# Patient Record
Sex: MALE | Race: WHITE | Employment: OTHER | URBAN - METROPOLITAN AREA
[De-identification: names, ages, dates, MRNs, and addresses within clinical notes are randomized per-mention and may not be internally consistent; named-entity substitution may affect disease eponyms.]

---

## 2017-01-04 ENCOUNTER — HOSPITAL ENCOUNTER (EMERGENCY)
Facility: HOSPITAL | Age: 67
Discharge: HOME | End: 2017-01-04
Attending: EMERGENCY MEDICINE
Payer: MEDICARE

## 2017-01-04 VITALS
WEIGHT: 158.38 LBS | BODY MASS INDEX: 25 KG/M2 | RESPIRATION RATE: 18 BRPM | DIASTOLIC BLOOD PRESSURE: 77 MMHG | HEART RATE: 74 BPM | OXYGEN SATURATION: 100 % | TEMPERATURE: 98 F | SYSTOLIC BLOOD PRESSURE: 123 MMHG

## 2017-01-04 DIAGNOSIS — R55 SYNCOPE, VASOVAGAL: Primary | ICD-10-CM

## 2017-01-04 LAB
ATRIAL RATE: 61 BPM
P AXIS: 35 DEGREES
P-R INTERVAL: 180 MS
Q-T INTERVAL: 398 MS
QRS DURATION: 86 MS
QTC CALCULATION (BEZET): 400 MS
R AXIS: -27 DEGREES
T AXIS: 23 DEGREES
VENTRICULAR RATE: 61 BPM

## 2017-01-04 PROCEDURE — 99284 EMERGENCY DEPT VISIT MOD MDM: CPT

## 2017-01-04 PROCEDURE — 93005 ELECTROCARDIOGRAM TRACING: CPT

## 2017-01-04 PROCEDURE — 93010 ELECTROCARDIOGRAM REPORT: CPT

## 2017-01-04 NOTE — ED PROVIDER NOTES
Patient Seen in: [facility] Emergency Department    History   Patient presents with:  Syncope (cardiovascular, neurologic)    Stated Complaint: syncope    HPI    21-year-old male presents emergency department who was sent to the ER after he had a [employer] Systems    Positive for stated complaint: syncope  Other systems are as noted in HPI. Constitutional and vital signs reviewed. All other systems reviewed and negative except as noted above.     [facility] elements reviewed from today and agreed except as ot medical condition and instructed to take medications as prescribed. Patient is aware that they are to return to ED if any worsening problems. Patient was also instructed to followup with the appropriate physician.   Patient verbalizes and agrees with plan

## 2018-08-06 PROBLEM — E66.3 OVERWEIGHT (BMI 25.0-29.9): Status: ACTIVE | Noted: 2018-08-06

## 2018-08-06 PROCEDURE — 84153 ASSAY OF PSA TOTAL: CPT | Performed by: INTERNAL MEDICINE

## 2019-03-21 PROBLEM — N18.30 CKD (CHRONIC KIDNEY DISEASE) STAGE 3, GFR 30-59 ML/MIN (HCC): Status: ACTIVE | Noted: 2019-03-21

## 2020-11-30 PROBLEM — N18.30 CHRONIC KIDNEY DISEASE, STAGE III (MODERATE) (HCC): Status: ACTIVE | Noted: 2020-11-30

## 2020-11-30 PROBLEM — N18.31 STAGE 3A CHRONIC KIDNEY DISEASE (HCC): Status: ACTIVE | Noted: 2019-03-21

## 2021-02-03 PROCEDURE — 88305 TISSUE EXAM BY PATHOLOGIST: CPT | Performed by: STUDENT IN AN ORGANIZED HEALTH CARE EDUCATION/TRAINING PROGRAM

## 2021-09-07 PROBLEM — G40.209 PARTIAL SYMPTOMATIC EPILEPSY WITH COMPLEX PARTIAL SEIZURES, NOT INTRACTABLE, WITHOUT STATUS EPILEPTICUS (HCC): Status: ACTIVE | Noted: 2021-09-07

## 2021-09-07 PROBLEM — D69.2 SENILE PURPURA (HCC): Status: ACTIVE | Noted: 2021-09-07

## 2022-10-06 ENCOUNTER — APPOINTMENT (OUTPATIENT)
Dept: MRI IMAGING | Facility: HOSPITAL | Age: 72
End: 2022-10-06
Attending: EMERGENCY MEDICINE
Payer: MEDICARE

## 2022-10-06 ENCOUNTER — HOSPITAL ENCOUNTER (EMERGENCY)
Facility: HOSPITAL | Age: 72
Discharge: HOME | End: 2022-10-06
Attending: EMERGENCY MEDICINE
Payer: MEDICARE

## 2022-10-06 VITALS
SYSTOLIC BLOOD PRESSURE: 149 MMHG | DIASTOLIC BLOOD PRESSURE: 77 MMHG | HEART RATE: 53 BPM | TEMPERATURE: 98 F | RESPIRATION RATE: 14 BRPM | WEIGHT: 160 LBS | OXYGEN SATURATION: 99 % | BODY MASS INDEX: 26 KG/M2

## 2022-10-06 DIAGNOSIS — R42 DIZZINESS: Primary | ICD-10-CM

## 2022-10-06 LAB
ALBUMIN SERPL-MCNC: 3.9 G/DL (ref 3.4–5)
ALBUMIN/GLOB SERPL: 1.1 {RATIO} (ref 1–2)
ALP LIVER SERPL-CCNC: 65 U/L
ALT SERPL-CCNC: 22 U/L
ANION GAP SERPL CALC-SCNC: 6 MMOL/L (ref 0–18)
AST SERPL-CCNC: 17 U/L (ref 15–37)
ATRIAL RATE: 49 BPM
BASOPHILS # BLD AUTO: 0.03 X10(3) UL (ref 0–0.2)
BASOPHILS NFR BLD AUTO: 0.4 %
BILIRUB SERPL-MCNC: 0.6 MG/DL (ref 0.1–2)
BUN BLD-MCNC: 22 MG/DL (ref 7–18)
CALCIUM BLD-MCNC: 10.2 MG/DL (ref 8.5–10.1)
CHLORIDE SERPL-SCNC: 109 MMOL/L (ref 98–112)
CO2 SERPL-SCNC: 26 MMOL/L (ref 21–32)
CREAT BLD-MCNC: 1.05 MG/DL
EOSINOPHIL # BLD AUTO: 0.07 X10(3) UL (ref 0–0.7)
EOSINOPHIL NFR BLD AUTO: 1 %
ERYTHROCYTE [DISTWIDTH] IN BLOOD BY AUTOMATED COUNT: 14.1 %
GFR SERPLBLD BASED ON 1.73 SQ M-ARVRAT: 75 ML/MIN/1.73M2 (ref 60–?)
GLOBULIN PLAS-MCNC: 3.6 G/DL (ref 2.8–4.4)
GLUCOSE BLD-MCNC: 103 MG/DL (ref 70–99)
HCT VFR BLD AUTO: 41.5 %
HGB BLD-MCNC: 13.6 G/DL
IMM GRANULOCYTES # BLD AUTO: 0.02 X10(3) UL (ref 0–1)
IMM GRANULOCYTES NFR BLD: 0.3 %
LYMPHOCYTES # BLD AUTO: 1.38 X10(3) UL (ref 1–4)
LYMPHOCYTES NFR BLD AUTO: 18.9 %
MCH RBC QN AUTO: 31.4 PG (ref 26–34)
MCHC RBC AUTO-ENTMCNC: 32.8 G/DL (ref 31–37)
MCV RBC AUTO: 95.8 FL
MONOCYTES # BLD AUTO: 0.48 X10(3) UL (ref 0.1–1)
MONOCYTES NFR BLD AUTO: 6.6 %
NEUTROPHILS # BLD AUTO: 5.34 X10 (3) UL (ref 1.5–7.7)
NEUTROPHILS # BLD AUTO: 5.34 X10(3) UL (ref 1.5–7.7)
NEUTROPHILS NFR BLD AUTO: 72.8 %
OSMOLALITY SERPL CALC.SUM OF ELEC: 296 MOSM/KG (ref 275–295)
P AXIS: 31 DEGREES
P-R INTERVAL: 186 MS
PLATELET # BLD AUTO: 194 10(3)UL (ref 150–450)
POTASSIUM SERPL-SCNC: 4.2 MMOL/L (ref 3.5–5.1)
PROT SERPL-MCNC: 7.5 G/DL (ref 6.4–8.2)
Q-T INTERVAL: 452 MS
QRS DURATION: 88 MS
QTC CALCULATION (BEZET): 408 MS
R AXIS: -34 DEGREES
RBC # BLD AUTO: 4.33 X10(6)UL
SODIUM SERPL-SCNC: 141 MMOL/L (ref 136–145)
T AXIS: 20 DEGREES
TROPONIN I HIGH SENSITIVITY: 5 NG/L
VENTRICULAR RATE: 49 BPM
WBC # BLD AUTO: 7.3 X10(3) UL (ref 4–11)

## 2022-10-06 PROCEDURE — 80053 COMPREHEN METABOLIC PANEL: CPT | Performed by: EMERGENCY MEDICINE

## 2022-10-06 PROCEDURE — 99285 EMERGENCY DEPT VISIT HI MDM: CPT

## 2022-10-06 PROCEDURE — 84484 ASSAY OF TROPONIN QUANT: CPT | Performed by: EMERGENCY MEDICINE

## 2022-10-06 PROCEDURE — 36415 COLL VENOUS BLD VENIPUNCTURE: CPT

## 2022-10-06 PROCEDURE — 70551 MRI BRAIN STEM W/O DYE: CPT | Performed by: EMERGENCY MEDICINE

## 2022-10-06 PROCEDURE — 93005 ELECTROCARDIOGRAM TRACING: CPT

## 2022-10-06 PROCEDURE — 85025 COMPLETE CBC W/AUTO DIFF WBC: CPT | Performed by: EMERGENCY MEDICINE

## 2022-10-06 PROCEDURE — 99284 EMERGENCY DEPT VISIT MOD MDM: CPT

## 2022-10-06 PROCEDURE — 93010 ELECTROCARDIOGRAM REPORT: CPT

## 2022-10-06 RX ORDER — SODIUM CHLORIDE 9 MG/ML
125 INJECTION, SOLUTION INTRAVENOUS CONTINUOUS
Status: DISCONTINUED | OUTPATIENT
Start: 2022-10-06 | End: 2022-10-06

## 2022-10-06 NOTE — ED INITIAL ASSESSMENT (HPI)
Patient reports dizziness, balance issues, left eye blurriness x2 days. States his Fitbit shows low HR. C/o SOB upon standing. Denies numbness/tingling, N/V, fever.

## 2023-07-05 ENCOUNTER — APPOINTMENT (OUTPATIENT)
Dept: GENERAL RADIOLOGY | Facility: HOSPITAL | Age: 73
End: 2023-07-05
Payer: MEDICARE

## 2023-07-05 ENCOUNTER — HOSPITAL ENCOUNTER (EMERGENCY)
Facility: HOSPITAL | Age: 73
Discharge: HOME | End: 2023-07-05
Attending: STUDENT IN AN ORGANIZED HEALTH CARE EDUCATION/TRAINING PROGRAM
Payer: MEDICARE

## 2023-07-05 VITALS
RESPIRATION RATE: 16 BRPM | WEIGHT: 158 LBS | HEIGHT: 66 IN | HEART RATE: 60 BPM | OXYGEN SATURATION: 98 % | DIASTOLIC BLOOD PRESSURE: 71 MMHG | SYSTOLIC BLOOD PRESSURE: 136 MMHG | TEMPERATURE: 98 F | BODY MASS INDEX: 25.39 KG/M2

## 2023-07-05 DIAGNOSIS — S86.002A INJURY OF LEFT ACHILLES TENDON, INITIAL ENCOUNTER: Primary | ICD-10-CM

## 2023-07-05 PROCEDURE — 99284 EMERGENCY DEPT VISIT MOD MDM: CPT

## 2023-07-05 PROCEDURE — 73590 X-RAY EXAM OF LOWER LEG: CPT

## 2023-07-05 PROCEDURE — 99283 EMERGENCY DEPT VISIT LOW MDM: CPT

## (undated) NOTE — ED AVS SNAPSHOT
[facility] Emergency Department    [place]  [address] [address]    Phone:  [phone]    Fax:  [phone]           Claudia Torstenneel   MRN: [id number]    Department:  [facility] Emergency Department   Date of Visit:  1/4/ a detailed feedback survey mailed to them a week after the visit. If you receive this, we would really appreciate it if you could take the time to complete it. Thank you! You were examined and treated today on an urgent basis only.   This was not a zhang [id number]  [facility] ([address]) [facility] [address] ([address]) [address] Jacki Donahue (Altagracia Watt) [address] [facility] Melissa Elder &

## (undated) NOTE — ED AVS SNAPSHOT
[facility] Emergency Department    [place]  [address] [address]    Phone:  [phone]    Fax:  [phone]           Dmitry Lakhwinder   MRN: [id number]    Department:  [facility] Emergency Department   Date of Visit:  1/4/ IF THERE IS ANY CHANGE OR WORSENING OF YOUR CONDITION, CALL YOUR PRIMARY CARE PHYSICIAN AT ONCE OR RETURN IMMEDIATELY TO THE EMERGENCY DEPARTMENT.     If you have been prescribed any medication(s), please fill your prescription right away and begin taking t